# Patient Record
Sex: FEMALE | Race: WHITE | Employment: OTHER | URBAN - METROPOLITAN AREA
[De-identification: names, ages, dates, MRNs, and addresses within clinical notes are randomized per-mention and may not be internally consistent; named-entity substitution may affect disease eponyms.]

---

## 2017-10-06 ENCOUNTER — HOSPITAL ENCOUNTER (OUTPATIENT)
Age: 52
Setting detail: OUTPATIENT SURGERY
Discharge: HOME | End: 2017-10-06
Attending: SURGERY | Admitting: SURGERY
Payer: COMMERCIAL

## 2017-10-06 VITALS
HEIGHT: 67 IN | HEART RATE: 60 BPM | DIASTOLIC BLOOD PRESSURE: 73 MMHG | SYSTOLIC BLOOD PRESSURE: 120 MMHG | OXYGEN SATURATION: 99 % | TEMPERATURE: 96.9 F | WEIGHT: 130 LBS | BODY MASS INDEX: 20.4 KG/M2 | RESPIRATION RATE: 16 BRPM

## 2017-10-06 PROCEDURE — 76040000007: Performed by: SURGERY

## 2017-10-06 PROCEDURE — 74011250636 HC RX REV CODE- 250/636

## 2017-10-06 PROCEDURE — 77030009426 HC FCPS BIOP ENDOSC BSC -B: Performed by: SURGERY

## 2017-10-06 PROCEDURE — 77030020263 HC SOL INJ SOD CL0.9% LFCR 1000ML: Performed by: SURGERY

## 2017-10-06 PROCEDURE — G0500 MOD SEDAT ENDO SERVICE >5YRS: HCPCS | Performed by: SURGERY

## 2017-10-06 RX ORDER — EPINEPHRINE 0.1 MG/ML
1 INJECTION INTRACARDIAC; INTRAVENOUS
Status: CANCELLED | OUTPATIENT
Start: 2017-10-06 | End: 2017-10-06

## 2017-10-06 RX ORDER — LIDOCAINE HYDROCHLORIDE 20 MG/ML
5 SOLUTION OROPHARYNGEAL ONCE
Status: CANCELLED | OUTPATIENT
Start: 2017-10-06 | End: 2017-10-06

## 2017-10-06 RX ORDER — SODIUM CHLORIDE 9 MG/ML
125 INJECTION, SOLUTION INTRAVENOUS CONTINUOUS
Status: CANCELLED | OUTPATIENT
Start: 2017-10-06 | End: 2017-10-06

## 2017-10-06 RX ORDER — SODIUM CHLORIDE 0.9 % (FLUSH) 0.9 %
5-10 SYRINGE (ML) INJECTION EVERY 8 HOURS
Status: CANCELLED | OUTPATIENT
Start: 2017-10-06 | End: 2017-10-06

## 2017-10-06 RX ORDER — NALOXONE HYDROCHLORIDE 0.4 MG/ML
0.4 INJECTION, SOLUTION INTRAMUSCULAR; INTRAVENOUS; SUBCUTANEOUS
Status: DISCONTINUED | OUTPATIENT
Start: 2017-10-06 | End: 2017-10-06 | Stop reason: HOSPADM

## 2017-10-06 RX ORDER — FENTANYL CITRATE 50 UG/ML
100 INJECTION, SOLUTION INTRAMUSCULAR; INTRAVENOUS
Status: DISCONTINUED | OUTPATIENT
Start: 2017-10-06 | End: 2017-10-06 | Stop reason: HOSPADM

## 2017-10-06 RX ORDER — ATROPINE SULFATE 0.1 MG/ML
0.5 INJECTION INTRAVENOUS
Status: CANCELLED | OUTPATIENT
Start: 2017-10-06 | End: 2017-10-06

## 2017-10-06 RX ORDER — MIDAZOLAM HYDROCHLORIDE 1 MG/ML
5 INJECTION, SOLUTION INTRAMUSCULAR; INTRAVENOUS
Status: DISCONTINUED | OUTPATIENT
Start: 2017-10-06 | End: 2017-10-06 | Stop reason: HOSPADM

## 2017-10-06 RX ORDER — SODIUM CHLORIDE 0.9 % (FLUSH) 0.9 %
5-10 SYRINGE (ML) INJECTION AS NEEDED
Status: CANCELLED | OUTPATIENT
Start: 2017-10-06 | End: 2017-10-06

## 2017-10-06 RX ORDER — FLUMAZENIL 0.1 MG/ML
0.2 INJECTION INTRAVENOUS
Status: DISCONTINUED | OUTPATIENT
Start: 2017-10-06 | End: 2017-10-06 | Stop reason: HOSPADM

## 2017-10-06 RX ORDER — DEXTROMETHORPHAN/PSEUDOEPHED 2.5-7.5/.8
1.2 DROPS ORAL
Status: CANCELLED | OUTPATIENT
Start: 2017-10-06

## 2017-10-06 NOTE — IP AVS SNAPSHOT
Summary of Care Report The Summary of Care report has been created to help improve care coordination. Users with access to [email or web address] or [address] (Web-based application) may access additional patient information including the Discharge Summary. If you are not currently a [address] user and need more information, please call the number listed below in the [place] [address] section and ask to be connected with Medical Records. Facility Information Name Address Phone [address] [address] [phone] Patient Information Patient Name Sex  Rachael Romero ([id number]) Female 1965 Discharge Information Admitting Provider Service Area Unit Anali Ascencio DO / [facility] Endoscopy / [phone] Discharge Provider Discharge Date/Time Discharge Disposition Destination (none) 10/6/2017 (Pending) AHR (none) Patient Language Language ENGLISH [13] Hospital Problems as of 10/6/2017  Reviewed: 10/6/2017  8:11 AM by Anali Ascencio DO None Non-Hospital Problems as of 10/6/2017  Reviewed: 10/6/2017  8:11 AM by Anali Ascencio DO None You are allergic to the following Allergen Reactions Tylenol (Acetaminophen) Rash Current Discharge Medication List  
  
Notice You have not been prescribed any medications. Surgery Information ID Date/Time Status Primary Surgeon All Procedures Location [id number] 10/6/2017 0800 Unposted Anali Ascencio DO COLONOSCOPY [facility] ENDOSCOPY Follow-up Information Follow up With Details Comments Contact Lawrence Witt MD   Patient can only remember the practice name and not the physician Discharge Instructions [place] Numbers 
[id number] 
1965 COLON DISCHARGE INSTRUCTIONS Discomfort: · Do not drive and operate hazardous machinery · Do not make important personal or business decisions · Do  not drink alcoholic beverages · If you have not urinated within 8 hours after discharge, please contact your surgeon on call. Report the following to your surgeon: 
· Excessive pain, swelling, redness or odor of or around the surgical area · Temperature over 100.5 · Nausea and vomiting lasting longer than 4 hours or if unable to take medications · Any signs of decreased circulation or nerve impairment to extremity: change in color, persistent  numbness, tingling, coldness or increase pain · Any questions What to do at Home: 
Recommended activity: Activity as tolerated and no driving for today, If you experience any of the following symptoms as above, please follow up with Dr. Warren Manrique. *  Please give a list of your current medications to your Primary Care Provider. *  Please update this list whenever your medications are discontinued, doses are 
    changed, or new medications (including over-the-counter products) are added. *  Please carry medication information at all times in case of emergency situations. These are general instructions for a healthy lifestyle: No smoking/ No tobacco products/ Avoid exposure to second hand smoke Surgeon General's Warning:  Quitting smoking now greatly reduces serious risk to your health. Obesity, smoking, and sedentary lifestyle greatly increases your risk for illness A healthy diet, regular physical exercise & weight monitoring are important for maintaining a healthy lifestyle You may be retaining fluid if you have a history of heart failure or if you experience any of the following symptoms:  Weight gain of 3 pounds or more overnight or 5 pounds in a week, increased swelling in our hands or feet or shortness of breath while lying flat in bed.   Please call your doctor as soon as you notice any of these symptoms; do not wait until your next office visit. Recognize signs and symptoms of STROKE: 
 
F-face looks uneven A-arms unable to move or move unevenly S-speech slurred or non-existent T-time-call 911 as soon as signs and symptoms begin-DO NOT go Back to bed or wait to see if you get better-TIME IS BRAIN. Warning Signs of HEART ATTACK Call 911 if you have these symptoms: 
? Chest discomfort. Most heart attacks involve discomfort in the center of the chest that lasts more than a few minutes, or that goes away and comes back. It can feel like uncomfortable pressure, squeezing, fullness, or pain. ? Discomfort in other areas of the upper body. Symptoms can include pain or discomfort in one or both arms, the back, neck, jaw, or stomach. ? Shortness of breath with or without chest discomfort. ? Other signs may include breaking out in a cold sweat, nausea, or lightheadedness. Don't wait more than five minutes to call [address]! Fast action can save your life. Calling 911 is almost always the fastest way to get lifesaving treatment. Emergency Medical Services staff can begin treatment when they arrive  up to an hour sooner than if someone gets to the hospital by car. The discharge information has been reviewed with the patient and spouse. The patient and spouse verbalized understanding. Discharge medications reviewed with the patient and spouse and appropriate educational materials and side effects teaching were provided. Patient armband removed and shredded Chart Review Routing History No Routing History on File

## 2017-10-06 NOTE — H&P
Assessment/Plan  # Detail Type Description    1. Assessment Encounter for preprocedural examination (Z01.818). Impression Patient is asymptomatic and due for a screening colonoscopy. Here for pre-procedure evaluation for medical issues which may interfere with colonoscopy success as well as clearance for anesthesia. Patient Plan Pt is an average risk patient presenting for colonoscopy and colon cancer screening. Pt risk was determined based on NCCN guidelines V1.2016. Risks benefits and alternatives of colonoscopy were discussed. Risk of perforation, bleeding or gas pain, missed polyps, inability to complete full colonoscopy, anesthesia complications discussed. Expectations of preoperative bowel prep /procedure described. Literature was given to the patient and reviewed. The patient showed understanding and agreed with the plan as above. Extended time was taken to answer all patient questions. Extra pre-procedure paperwork and counseling completed. Moviprep prescription and directions provided. Pt has been educated on the Moviprep. Pt has been instructed that he is not to eat legumes, nuts, seeds or vegetables that are high in cellulose one week prior to the planned procedure. Pt should avoid red, purple and orange liquids, alen, and juices. Pt is to be on a clear liquid diet 24 hours prior to the procedure. Pt will start the Moviprep at 1800 hours the night prior to the procedure with the second portion of the prep to be taken 6 hours prior to time of the appointment. This 47year old female presents for Colon Cancer Screen. History of Present Illness:  1. Colon Cancer Screen      No prior screening. Risk Factors: Mother- Lung Father possible colon. Pertinent negatives include constipation, diarrhea, nausea, vomiting and weight loss. Additional information: No family history of Crohn's/colitis, Patient has family history of colon cancer and No NSAID/ASA use.        No ED, no anesthesia problems, no prior abd surgeries, no blood thinners. PROBLEM LIST:  Problem Description Onset Date   Allergic rhinitis 2011   Vitamin D deficiency 2012   Headache 2012       PAST MEDICAL/SURGICAL HISTORY  (Detailed)    Disease/disorder Onset Date Management Date Comments   Diabetes gestational       Hyperlipidemia  lifestyle       Family History:  (Detailed)  Relationship Family Member Name  Age at Death Condition Onset Age Cause of Death   Brother    Tuberculosis  N   Father    Cancer, colon  N   Mother    Cancer, lung  N       Social History:  (Detailed)  Tobacco use reviewed. Preferred language is [place]. The patient does not need an . EDUCATION/EMPLOYMENT/OCCUPATION  Employment History Status Retired Restrictions    office        MARITAL STATUS/FAMILY/SOCIAL SUPPORT  Currently . CHILDREN  Has children:  2 son(s). The patient lives with opposite sex spouse and 2 son(s). Smoking status: Never smoker. SMOKING STATUS  Use Status Type Smoking Status Usage Per Day Years Used Total Pack Years   no/never  Never smoker          ALCOHOL  There is a history of alcohol use. Type: Wine. 1 glass consumed occasionally. Last alcoholic drink was last week. CAFFEINE  The patient uses caffeine: tea and soda. - 2 cups a day. LIFESTYLE  Moderate activity level. Exercise includes walking. Exercises daily. Hobbies include reading. DIET  healthy. Medication Reconciliation  Medications reconciled today. Allergies:  Ingredient Reaction Medication Name Comment   HYDROCODONE      ACETAMINOPHEN  Tylenol    (Reviewed, no changes.)  Review of Systems  System Neg/Pos Details   Constitutional Negative Chills, fever, night sweats and weight loss. ENMT Negative Hearing loss. Respiratory Negative Cough, dyspnea, known TB exposure and wheezing. Cardio Negative Chest pain, claudication, edema and irregular heartbeat/palpitations.    GI Negative Blood in stool, constipation, diarrhea, nausea and vomiting.  Negative Dysuria, hematuria and urinary incontinence. Endocrine Negative Cold intolerance. Neuro Negative Headache and seizures. Integumentary Negative Change in shape/size of mole(s) and skin lesion. MS Negative Back pain and muscle weakness. Hema/Lymph Negative Easy bleeding and easy bruising. Allergic/Immuno Negative Contact allergy. Reproductive Positive The patient is post-menopausal (Occurred at age 47. The menopause was natural). Reproductive Negative Vaginal discharge. Vital Signs     Gynecologic History  Patient is postmenopausal.      Height  Time ft in cm Last Measured Height Position   2:23 PM 5.0 7.00 170.18 07/05/2017 Standing     Weight/BSA/BMI  Time lb oz kg Context BMI kg/m2 BSA m2   2:23 .00  60.781 dressed with shoes 20.99      Blood Pressure  Time BP mm/Hg Position Side Site Method Cuff Size   2:23 /74 sitting right arm automatic      Temperature/Pulse/Respiration  Time Temp F Temp C Temp Site Pulse/min Pattern Resp/ min   2:23 PM 97.90 36.61 ear 59 regular      Measured By  Time Measured by   2:23 PM Melba Pizarro       Physical Exam:  Exam Findings Details   Constitutional * Overall appearance - well developed, 3 vital signs seen above. Eyes Normal Conjunctiva - Right: Normal, Left: Normal. Pupil - Right: Normal, Left: Normal.   Ears Normal Inspection - Right: Normal, Left: Normal.   Nose/Mouth/Throat Normal External nose - Normal. Lips/teeth/gums - Normal.   Neck Exam Normal Inspection - Normal.   Respiratory Normal Inspection - Normal. Effort - Normal.   Vascular Normal Capillary refill - Less than 2 seconds. Abdomen Normal Inspection - Normal. Auscultation - Normal. No abdominal tenderness. No hepatic enlargement. No spleen enlargement. No hernia. Rectal Normal Sphincter - Normal. Fecal occult blood test - Not indicated.    Skin Normal Inspection - Normal.   Musculoskeletal Normal Visual overview of all four extremities is normal.   Extremity Normal No edema. Neurological Normal Memory - Normal. Cranial nerves - Cranial nerves II through XII grossly intact. Psychiatric Normal Orientation - Oriented to time, place, person & situation. Appropriate mood and affect. Normal insight. Normal judgment. Medications (added, continued, or stopped this visit):  Started Medication Directions Instruction Stopped    Vitamin D3 400 unit capsule        Counseling / Educational Factors:  Readiness to learn: accepting. Barriers to learning: language (English) and cultural. No learning preferences specified. This is a visit of 45 minutes. 30 minutes were spent counseling.minutes were spent counseling.

## 2017-10-06 NOTE — DISCHARGE INSTRUCTIONS
Monique Nelson  [id number]  1965    COLON DISCHARGE INSTRUCTIONS    Discomfort:  Redness at IV site- apply warm compress to area; if redness or soreness persist- contact your physician  There may be a slight amount of blood passed from the rectum  Gaseous discomfort- walking, belching will help relieve any discomfort  You should not operate a vehicle for 12 hours  You should not engage in an occupation involving machinery or appliances for rest of today  You may not drink alcoholic beverages for at least 12 hours  Avoid making any critical decisions for at least 24 hour  DIET:   Regular or resume diet normally appropriate for you. - however -  remember your colon is empty and a heavy meal will produce gas. Avoid these foods:  vegetables, fried / greasy foods, carbonated drinks for today     ACTIVITY:  You may resume your normal daily activities it is recommended that you spend the remainder of the day resting -  avoid any strenuous activity. CALL M.D. ANY SIGN OF:   Increasing pain, nausea, vomiting  Abdominal distension (swelling)  New increased bleeding (oral or rectal)  Fever (chills)  Pain in chest area  Bloody discharge from nose or mouth  Shortness of breath      Follow-up Instructions:   If biopsies were taken, please call Dr. Jahaira Duque office in next 2 weeks, [phone]. Otherwise follow up with your PCM.                         Moniquedexter Nelson  [id number]  1965        DISCHARGE SUMMARY from Nurse    The following personal items collected during your admission are returned to you:   Dental Appliance: Dental Appliances: None  Vision: Visual Aid: None  Hearing Aid:    Jewelry:    Clothing:    Other Valuables:    Valuables sent to safe:              DISCHARGE SUMMARY from Nurse    The following personal items are in your possession at time of discharge:    Dental Appliances: None  Visual Aid: None                            PATIENT INSTRUCTIONS:    After general anesthesia or intravenous sedation, for 24 hours or while taking prescription Narcotics:  · Limit your activities  · Do not drive and operate hazardous machinery  · Do not make important personal or business decisions  · Do  not drink alcoholic beverages  · If you have not urinated within 8 hours after discharge, please contact your surgeon on call. Report the following to your surgeon:  · Excessive pain, swelling, redness or odor of or around the surgical area  · Temperature over 100.5  · Nausea and vomiting lasting longer than 4 hours or if unable to take medications  · Any signs of decreased circulation or nerve impairment to extremity: change in color, persistent  numbness, tingling, coldness or increase pain  · Any questions        What to do at Home:  Recommended activity: Activity as tolerated and no driving for today,     If you experience any of the following symptoms as above, please follow up with Dr. Nancy Melton. *  Please give a list of your current medications to your Primary Care Provider. *  Please update this list whenever your medications are discontinued, doses are      changed, or new medications (including over-the-counter products) are added. *  Please carry medication information at all times in case of emergency situations. These are general instructions for a healthy lifestyle:    No smoking/ No tobacco products/ Avoid exposure to second hand smoke    Surgeon General's Warning:  Quitting smoking now greatly reduces serious risk to your health. Obesity, smoking, and sedentary lifestyle greatly increases your risk for illness    A healthy diet, regular physical exercise & weight monitoring are important for maintaining a healthy lifestyle    You may be retaining fluid if you have a history of heart failure or if you experience any of the following symptoms:  Weight gain of 3 pounds or more overnight or 5 pounds in a week, increased swelling in our hands or feet or shortness of breath while lying flat in bed. Please call your doctor as soon as you notice any of these symptoms; do not wait until your next office visit. Recognize signs and symptoms of STROKE:    F-face looks uneven    A-arms unable to move or move unevenly    S-speech slurred or non-existent    T-time-call 911 as soon as signs and symptoms begin-DO NOT go       Back to bed or wait to see if you get better-TIME IS BRAIN. Warning Signs of HEART ATTACK     Call 911 if you have these symptoms:   Chest discomfort. Most heart attacks involve discomfort in the center of the chest that lasts more than a few minutes, or that goes away and comes back. It can feel like uncomfortable pressure, squeezing, fullness, or pain.  Discomfort in other areas of the upper body. Symptoms can include pain or discomfort in one or both arms, the back, neck, jaw, or stomach.  Shortness of breath with or without chest discomfort.  Other signs may include breaking out in a cold sweat, nausea, or lightheadedness. Don't wait more than five minutes to call 911 - MINUTES MATTER! Fast action can save your life. Calling 911 is almost always the fastest way to get lifesaving treatment. Emergency Medical Services staff can begin treatment when they arrive -- up to an hour sooner than if someone gets to the hospital by car. The discharge information has been reviewed with the patient and spouse. The patient and spouse verbalized understanding. Discharge medications reviewed with the patient and spouse and appropriate educational materials and side effects teaching were provided.     Patient armband removed and shredded

## 2017-10-06 NOTE — INTERVAL H&P NOTE
H&P Update:  Boogie Reyes was seen and examined. History and physical has been reviewed. The patient has been examined. There have been no significant clinical changes since the completion of the originally dated History and Physical.  Patient identified by surgeon; surgical site was confirmed by patient and surgeon.     Signed By: Venice Gómez DO     October 6, 2017 8:11 AM

## 2017-10-06 NOTE — PROCEDURES
Colonoscopy Procedure Note    Indications: Screening for colon cancer    Anesthesia/Sedation: MAC anesthesia    Pre-Procedure Exam:  Airway: clear   Heart: normal S1and S2    Lungs: clear bilateral  Abdomen: soft, nontender, bowel sounds present and normal in all quadrants   Mental Status: awake, alert, and oriented to person, place, and time      Procedure in Detail:  Informed consent was obtained for the procedure, including sedation. Risks of perforation, hemorrhage, adverse drug reaction, and aspiration were discussed. The patient was placed in the left lateral decubitus position. Based on the pre-procedure assessment, including review of the patient's medical history, medications, allergies, and review of systems, he had been deemed to be an appropriate candidate for moderate sedation; he was therefore sedated with the medications listed above. The patient was monitored continuously with ECG tracing, pulse oximetry, blood pressure monitoring, and direct observations. A digital rectal examination was performed. The [id number] was inserted into the rectum and advanced under direct vision to the cecum, which was identified by the ileocecal valve and appendiceal orifice. The quality of the colonic preparation was excellent. A careful inspection of the mucosa was made as the colonoscope was withdrawn, including a careful and complete straight view of the rectum and anorectal junction; findings and interventions are described below. Appropriate photodocumentation was obtained. Findings:   ANUS: Anal exam reveals no masses or hemorrhoids, sphincter tone is normal.   RECTUM: Rectal exam reveals no masses or hemorrhoids. SIGMOID COLON: The mucosa is normal with good vascular pattern and without ulcers, diverticula, and polyps. DESCENDING COLON: The mucosa is normal with good vascular pattern and without ulcers, diverticula, and polyps.    SPLENIC FLEXURE: The splenic flexure is normal.   TRANSVERSE COLON: The mucosa is normal with good vascular pattern and without ulcers, diverticula, and polyps. HEPATIC FLEXURE: The hepatic flexure is normal.   ASCENDING COLON: The mucosa is normal with good vascular pattern and without ulcers, diverticula, and polyps. CECUM: The appendiceal orifice appears normal. The ileocecal valve appears normal.   TERMINAL ILEUM: The terminal ileum was not entered. Specimens: No specimens were collected. EBL: None    Complications: None; patient tolerated the procedure well. Recommendations:   - For colon cancer screening in this average-risk patient, colonoscopy may be repeated in 10 years. - Follow up with primary care physician. Signed By: DR. Felix Carrizales, [facility] Colorectal Surgery                       10/6/2017

## 2017-10-06 NOTE — IP AVS SNAPSHOT
Lebron Velez 
 
 
 [address] [facility] [address] 
[phone] Patient: Vivienne Denton MRN: [id number] :1965 You are allergic to the following Allergen Reactions Tylenol (Acetaminophen) Rash Recent Documentation Height Weight Breastfeeding? BMI OB Status Smoking Status 1.702 m 59 kg No 20.36 kg/m2 Postmenopausal Never Smoker Emergency Contacts Name Discharge Info Relation Home Work Mobile Piotr Wilson DISCHARGE CAREGIVER [3]  [phone] About your hospitalization You were admitted on:  2017 You last received care in the:  [facility] ENDOSCOPY You were discharged on:  2017 Unit phone number:  [phone] Why you were hospitalized Your primary diagnosis was:  Not on File Providers Seen During Your Hospitalizations Provider Role Specialty Primary office phone Watson Maciel DO Attending Provider Colon and Rectal Surgery [phone] Your Primary Care Physician (PCP) Primary Care Physician Office Phone Office Fax OTHER, PHYS ** None ** ** None ** Follow-up Information Follow up With Details Comments Contact Info Ella Witt MD   Patient can only remember the practice name and not the physician Current Discharge Medication List  
  
Notice You have not been prescribed any medications. Discharge Instructions Vivienne Denton 
[id number] 
1965 COLON DISCHARGE INSTRUCTIONS Discomfort: 
Redness at IV site- apply warm compress to area; if redness or soreness persist- contact your physician There may be a slight amount of blood passed from the rectum Gaseous discomfort- walking, belching will help relieve any discomfort You should not operate a vehicle for 12 hours You should not engage in an occupation involving machinery or appliances for rest of today You may not drink alcoholic beverages for at least 12 hours Avoid making any critical decisions for at least 24 hour DIET: 
 Regular or resume diet normally appropriate for you.  however -  remember your colon is empty and a heavy meal will produce gas. Avoid these foods:  vegetables, fried / greasy foods, carbonated drinks for today ACTIVITY: 
You may resume your normal daily activities it is recommended that you spend the remainder of the day resting -  avoid any strenuous activity. CALL M.D. ANY SIGN OF: Increasing pain, nausea, vomiting Abdominal distension (swelling) New increased bleeding (oral or rectal) Fever (chills) Pain in chest area Bloody discharge from nose or mouth Shortness of breath Follow-up Instructions: 
 If biopsies were taken, please call Dr. Amos Aguirreings office in next 2 weeks, [phone]. Otherwise follow up with your PCM. Vivienne Gallegodrickson 
[id number] 
1965 DISCHARGE SUMMARY from Nurse The following personal items collected during your admission are returned to you:  
Dental Appliance: Dental Appliances: None Vision: Visual Aid: None Hearing Aid:   
Jewelry:   
Clothing:   
Other Valuables:   
Valuables sent to safe:   
 
 
 
 
 
DISCHARGE SUMMARY from Nurse The following personal items are in your possession at time of discharge: 
 
Dental Appliances: None Visual Aid: None PATIENT INSTRUCTIONS: 
 
 
F-face looks uneven A-arms unable to move or move unevenly S-speech slurred or non-existent T-time-call 911 as soon as signs and symptoms begin-DO NOT go Back to bed or wait to see if you get better-TIME IS BRAIN.  
 
Warning Signs of HEART ATTACK  
 
 Call 911 if you have these symptoms: 
? Chest discomfort. Most heart attacks involve discomfort in the center of the chest that lasts more than a few minutes, or that goes away and comes back. It can feel like uncomfortable pressure, squeezing, fullness, or pain. ? Discomfort in other areas of the upper body. Symptoms can include pain or discomfort in one or both arms, the back, neck, jaw, or stomach. ? Shortness of breath with or without chest discomfort. ? Other signs may include breaking out in a cold sweat, nausea, or lightheadedness. Don't wait more than five minutes to call [address]! Fast action can save your life. Calling 911 is almost always the fastest way to get lifesaving treatment. Emergency Medical Services staff can begin treatment when they arrive  up to an hour sooner than if someone gets to the hospital by car. The discharge information has been reviewed with the patient and spouse. The patient and spouse verbalized understanding. Discharge medications reviewed with the patient and spouse and appropriate educational materials and side effects teaching were provided. Patient armband removed and shredded Discharge Instructions Attachments/References COLONOSCOPY : POST-OP (ENGLISH) Discharge Orders None Introducing [facility] SERVICES! Tanya Nice introduces enVista patient portal. Now you can access parts of your medical record, email your doctor's office, and request medication refills online. 1. In your internet browser, go to [email or web address] 2. Click on the First Time User? Click Here link in the Sign In box. You will see the New Member Sign Up page. 3. Enter your enVista Access Code exactly as it appears below. You will not need to use this code after youve completed the sign-up process. If you do not sign up before the expiration date, you must request a new code. · Zixi Access Code: [email or web address] Expires: 12/20/2017  7:21 AM 
 
4. Enter the last four digits of your Social Security Number (xxxx) and Date of Birth (mm/dd/yyyy) as indicated and click Submit. You will be taken to the next sign-up page. 5. Create a Zixi ID. This will be your Zixi login ID and cannot be changed, so think of one that is secure and easy to remember. 6. Create a Zixi password. You can change your password at any time. 7. Enter your Password Reset Question and Answer. This can be used at a later time if you forget your password. 8. Enter your e-mail address. You will receive e-mail notification when new information is available in [address] 9. Click Sign Up. You can now view and download portions of your medical record. 10. Click the Download Summary menu link to download a portable copy of your medical information. If you have questions, please visit the Frequently Asked Questions section of the Zixi website. Remember, Zixi is NOT to be used for urgent needs. For medical emergencies, dial 911. Now available from your iPhone and Android! General Information Please provide this summary of care documentation to your next provider. Patient Signature:  ____________________________________________________________ Date:  ____________________________________________________________  
  
Robert Lacy Provider Signature:  ____________________________________________________________ Date:  ____________________________________________________________ More Information Colonoscopy: What to Expect at [facility] Your Recovery After you have a colonoscopy, you will stay at the clinic for 1 to 2 hours until the medicines wear off. Then you can go home. But you will need to arrange for a ride. Your doctor will tell you when you can eat and do your other usual activities. Your doctor will talk to you about when you will need your next colonoscopy. Your doctor can help you decide how often you need to be checked. This will depend on the results of your test and your risk for colorectal cancer. After the test, you may be bloated or have gas pains. You may need to pass gas. If a biopsy was done or a polyp was removed, you may have streaks of blood in your stool (feces) for a few days. This care sheet gives you a general idea about how long it will take for you to recover. But each person recovers at a different pace. Follow the steps below to get better as quickly as possible. How can you care for yourself at home? Activity · Rest when you feel tired. · You can do your normal activities when it feels okay to do so. Diet · Follow your doctor's directions for eating. · Unless your doctor has told you not to, drink plenty of fluids. This helps to replace the fluids that were lost during the colon prep. · Do not drink alcohol. Medicines · Your doctor will tell you if and when you can restart your medicines. He or she will also give you instructions about taking any new medicines. · If you take blood thinners, such as warfarin (Coumadin), clopidogrel (Plavix), or aspirin, be sure to talk to your doctor. He or she will tell you if and when to start taking those medicines again. Make sure that you understand exactly what your doctor wants you to do. · If polyps were removed or a biopsy was done during the test, your doctor may tell you not to take aspirin or other anti-inflammatory medicines for a few days. These include ibuprofen (Advil, Motrin) and naproxen (Aleve). Other instructions · For your safety, do not drive or operate machinery until the medicine wears off and you can think clearly.  Your doctor may tell you not to drive or operate machinery until the day after your test. 
· Do not sign legal documents or make major decisions until the medicine wears off and you can think clearly. The anesthesia can make it hard for you to fully understand what you are agreeing to. Follow-up care is a key part of your treatment and safety. Be sure to make and go to all appointments, and call your doctor if you are having problems. It's also a good idea to know your test results and keep a list of the medicines you take. When should you call for help? Call 911 anytime you think you may need emergency care. For example, call if: 
· You passed out (lost consciousness). · You pass maroon or bloody stools. · You have severe belly pain. Call your doctor now or seek immediate medical care if: 
· Your stools are black and tarlike. · Your stools have streaks of blood, but you did not have a biopsy or any polyps removed. · You have belly pain, or your belly is swollen and firm. · You vomit. · You have a fever. · You are very dizzy. Watch closely for changes in your health, and be sure to contact your doctor if you have any problems. Where can you learn more? Go to http://mendez-jenny.info/. Enter E264 in the search box to learn more about \"Colonoscopy: What to Expect at Home. \" Current as of: August 9, 2016 Content Version: 11.3 © 8962-2598 [employer]. Care instructions adapted under license by [employer] (which disclaims liability or warranty for this information). If you have questions about a medical condition or this instruction, always ask your healthcare professional. [place] [address] any warranty or liability for your use of this information.

## (undated) DEVICE — SET ADMIN 16ML TBNG L100IN 2 Y INJ SITE IV PIGGY BK DISP

## (undated) DEVICE — Device

## (undated) DEVICE — NDL PRT INJ NSAF BLNT 18GX1.5 --

## (undated) DEVICE — FORCEPS BX CAP 240CM L RAD JAW 4

## (undated) DEVICE — TRAP SPEC COLL POLYP POLYSTYR --

## (undated) DEVICE — SYR 3ML LL TIP 1/10ML GRAD --

## (undated) DEVICE — MAJ-1414 SINGLE USE ADPATER BIOPSY VALV: Brand: SINGLE USE ADAPTOR BIOPSY VALVE

## (undated) DEVICE — ALL PURPOSE SPONGES,NON-WOVEN, 4 PLY: Brand: CURITY

## (undated) DEVICE — SINGLE PORT MANIFOLD: Brand: NEPTUNE 2

## (undated) DEVICE — AIRLIFE™ NASAL OXYGEN CANNULA CURVED, FLARED TIP WITH 14 FOOT (4.3 M) CRUSH-RESISTANT TUBING, OVER-THE-EAR STYLE: Brand: AIRLIFE™

## (undated) DEVICE — EJECTOR SALIVA 6 IN FLX CLR

## (undated) DEVICE — KENDALL RADIOLUCENT FOAM MONITORING ELECTRODE RECTANGULAR SHAPE: Brand: KENDALL

## (undated) DEVICE — MOUTHPIECE ENDOSCP 20X27MM --

## (undated) DEVICE — TRNQT TEXT 1X18IN BLU LF DISP -- CONVERT TO ITEM 362165

## (undated) DEVICE — ENDO CARRY-ON PROCEDURE KIT INCLUDES ENZYMATIC SPONGE, GAUZE, BIOHAZARD LABEL, TRAY, LUBRICANT, DIRTY SCOPE LABEL, WATER LABEL, TRAY, DRAWSTRING PAD, AND DEFENDO 4-PIECE KIT.: Brand: ENDO CARRY-ON PROCEDURE KIT

## (undated) DEVICE — 4-PORT MANIFOLD: Brand: NEPTUNE 2

## (undated) DEVICE — SYR 50ML SLIP TIP NSAF LF STRL --

## (undated) DEVICE — BRUSH CYTO L240CM DIA2.3MM GI COLONOSCOPY 3 RNG HNDL RADPQ

## (undated) DEVICE — CATH IV SAFE STR 22GX1IN BLU -- PROTECTIV PLUS